# Patient Record
Sex: FEMALE | Race: OTHER | HISPANIC OR LATINO
[De-identification: names, ages, dates, MRNs, and addresses within clinical notes are randomized per-mention and may not be internally consistent; named-entity substitution may affect disease eponyms.]

---

## 2019-07-03 PROBLEM — Z00.00 ENCOUNTER FOR PREVENTIVE HEALTH EXAMINATION: Status: ACTIVE | Noted: 2019-07-03

## 2019-07-30 ENCOUNTER — APPOINTMENT (OUTPATIENT)
Dept: ENDOCRINOLOGY | Facility: CLINIC | Age: 34
End: 2019-07-30
Payer: COMMERCIAL

## 2019-07-30 VITALS
BODY MASS INDEX: 22.99 KG/M2 | SYSTOLIC BLOOD PRESSURE: 96 MMHG | DIASTOLIC BLOOD PRESSURE: 62 MMHG | HEART RATE: 89 BPM | WEIGHT: 138 LBS | HEIGHT: 65 IN

## 2019-07-30 DIAGNOSIS — Z80.9 FAMILY HISTORY OF MALIGNANT NEOPLASM, UNSPECIFIED: ICD-10-CM

## 2019-07-30 DIAGNOSIS — H04.123 DRY EYE SYNDROME OF BILATERAL LACRIMAL GLANDS: ICD-10-CM

## 2019-07-30 DIAGNOSIS — Z82.49 FAMILY HISTORY OF ISCHEMIC HEART DISEASE AND OTHER DISEASES OF THE CIRCULATORY SYSTEM: ICD-10-CM

## 2019-07-30 DIAGNOSIS — Z86.69 PERSONAL HISTORY OF OTHER DISEASES OF THE NERVOUS SYSTEM AND SENSE ORGANS: ICD-10-CM

## 2019-07-30 DIAGNOSIS — D69.2 OTHER NONTHROMBOCYTOPENIC PURPURA: ICD-10-CM

## 2019-07-30 DIAGNOSIS — Z87.42 PERSONAL HISTORY OF OTHER DISEASES OF THE FEMALE GENITAL TRACT: ICD-10-CM

## 2019-07-30 PROCEDURE — 36415 COLL VENOUS BLD VENIPUNCTURE: CPT

## 2019-07-30 PROCEDURE — 99204 OFFICE O/P NEW MOD 45 MIN: CPT | Mod: 25

## 2019-07-30 RX ORDER — POLYETHYLENE GLYCOL 400 AND PROPYLENE GLYCOL 4; 3 MG/ML; MG/ML
0.4-0.3 SOLUTION/ DROPS OPHTHALMIC
Refills: 0 | Status: ACTIVE | COMMUNITY

## 2019-07-30 RX ORDER — TOBRAMYCIN AND DEXAMETHASONE 1; 3 MG/ML; MG/ML
0.3-0.1 SUSPENSION/ DROPS OPHTHALMIC
Refills: 0 | Status: ACTIVE | COMMUNITY

## 2019-07-30 RX ORDER — PSYLLIUM SEED
PACKET (EA) ORAL
Refills: 0 | Status: ACTIVE | COMMUNITY

## 2019-07-30 RX ORDER — PNV/FERROUS SULFATE/FOLIC ACID 27-<0.5MG
TABLET ORAL
Refills: 0 | Status: ACTIVE | COMMUNITY

## 2019-07-30 NOTE — PHYSICAL EXAM
[No Acute Distress] : no acute distress [Alert] : alert [Well Nourished] : well nourished [Healthy Appearance] : healthy appearance [Well Developed] : well developed [Normal Voice/Communication] : normal voice communication [Normal Sclera/Conjunctiva] : normal sclera/conjunctiva [EOMI] : extra ocular movement intact [PERRL] : pupils equal, round and reactive to light [Normal Oropharynx] : the oropharynx was normal [No Proptosis] : no proptosis [Thyroid Not Enlarged] : the thyroid was not enlarged [No Thyroid Nodules] : there were no palpable thyroid nodules [No Respiratory Distress] : no respiratory distress [No Accessory Muscle Use] : no accessory muscle use [Clear to Auscultation] : lungs were clear to auscultation bilaterally [Normal Rate] : heart rate was normal  [Pedal Pulses Normal] : the pedal pulses are present [Regular Rhythm] : with a regular rhythm [No Edema] : there was no peripheral edema [No Spinal Tenderness] : no spinal tenderness [Spine Straight] : spine straight [Normal Gait] : normal gait [No Stigmata of Cushings Syndrome] : no stigmata of cushings syndrome [Normal Strength/Tone] : muscle strength and tone were normal [No Rash] : no rash [No Tremors] : no tremors [Oriented x3] : oriented to person, place, and time [Normal Mood] : the mood was normal [Normal Affect] : the affect was normal [Normal Insight/Judgement] : insight and judgment were intact [Acanthosis Nigricans] : no acanthosis nigricans

## 2019-07-30 NOTE — ASSESSMENT
[FreeTextEntry1] : &\par Currently pregnant.\par Has history of elevated prolactin.\par By history, most likely a microadenoma.\par Will check prolactin, TSH today.\par ROV 3 weeks.\par [facility] records requested.

## 2019-07-30 NOTE — HISTORY OF PRESENT ILLNESS
[FreeTextEntry1] : July 30, 2019\par PCP:  Dr. Sawyer Lazo (per [facility] ID)  as well as [facility] (per [facility] records)  \par          High risk:  Dr. Lizbet Carter \par          Midwife c/o [facility]:  Clarice Hernandezion   phone [phone]   fax [phone]  \par \par \par CC:  Pregnant with EDC  12/24/2019   (last dopamine agonist 6/2018)  LMP March 19, 2019\par         History of hyperprolactinemia\par \par 32 [place] - [place] [facility] - [place] by her .   She is pregnant with EDC December 24, 2019.   Being followed by her nurse midwife at [facility] as well as by high risk practitioner, Dr. Lizbet Carter.  \par She currently feels well and is not taking medication to lower the prolactin.  \par \par Impression:  History of hyperprolactinemia.   Those records are not currently available.\par \par Plan: Updated lab tests today and ROV in about 3 weeks.   \par Will request previous records from [facility], especially lab data, prolactin levels.    \par \par \par \par

## 2019-08-01 LAB
ALBUMIN SERPL ELPH-MCNC: 3.7 G/DL
ALP BLD-CCNC: 52 U/L
ALT SERPL-CCNC: 40 U/L
ANION GAP SERPL CALC-SCNC: 12 MMOL/L
AST SERPL-CCNC: 25 U/L
BILIRUB DIRECT SERPL-MCNC: 0.1 MG/DL
BILIRUB INDIRECT SERPL-MCNC: 0.1 MG/DL
BILIRUB SERPL-MCNC: <0.2 MG/DL
BUN SERPL-MCNC: 13 MG/DL
CALCIUM SERPL-MCNC: 9.5 MG/DL
CHLORIDE SERPL-SCNC: 106 MMOL/L
CO2 SERPL-SCNC: 19 MMOL/L
CREAT SERPL-MCNC: 0.52 MG/DL
GLUCOSE SERPL-MCNC: 82 MG/DL
HCG SERPL-MCNC: ABNORMAL MIU/ML
POTASSIUM SERPL-SCNC: 4.2 MMOL/L
PROLACTIN SERPL-MCNC: 269 NG/ML
PROT SERPL-MCNC: 6.3 G/DL
SODIUM SERPL-SCNC: 137 MMOL/L
T4 SERPL-MCNC: 7.8 UG/DL
TSH SERPL-ACNC: 1.55 UIU/ML

## 2019-09-04 ENCOUNTER — APPOINTMENT (OUTPATIENT)
Dept: ENDOCRINOLOGY | Facility: CLINIC | Age: 34
End: 2019-09-04
Payer: COMMERCIAL

## 2019-09-04 VITALS
HEART RATE: 82 BPM | HEIGHT: 65 IN | SYSTOLIC BLOOD PRESSURE: 100 MMHG | DIASTOLIC BLOOD PRESSURE: 60 MMHG | BODY MASS INDEX: 24.66 KG/M2 | WEIGHT: 148 LBS

## 2019-09-04 PROCEDURE — 99214 OFFICE O/P EST MOD 30 MIN: CPT

## 2019-09-05 NOTE — ASSESSMENT
[FreeTextEntry1] : ~\par History of elevated prolactin treated in [place] with dopamine agonist.\par Prolactin currently within gestationally appropriate range.\par Will continue surveillance.\par After delivery can be considered for MRI of pituitary.

## 2019-09-05 NOTE — HISTORY OF PRESENT ILLNESS
[FreeTextEntry1] : September 4, 2019\par \par PCP:   [facility] (per [facility] records)  \par          High risk:  Dr. Lizbet Carter \par          Midwife c/o [facility]:  Cici Kenyon - phone [phone]   fax [phone]  \par \par \par CC:  Pregnant with EDC  12/24/2019   (last dopamine agonist 6/2018)  LMP March 19, 2019\par         History of hyperprolactinemia\par \par Told of elevated prolactin in [place].  May have been treated with dopamine agonist in [place] for a month or two in 2018.  \par Reference prolactin values during pregnancy are:\par 1st trimester, up to 213\par 2nd trimester, up to 330\par 3rd trimester, up to 372\par \par On 7/30/19, her prolactin was 269 when TSH 1.55\par \par Impression:  Prolactinoma.\par Feels well, doing well.  \par \par Plan:  Surveillance.     \par ROV in November and then January.  \par Will eventually arrange for MRI of sella.  \par \par \par \par July 30, 2019\par PCP:  Dr. Sawyer Lazo (per [facility] ID)  as well as [facility] (per [facility] records)  \par          High risk:  Dr. Lizbet Carter \par          Midwife c/o [facility]:  Clarice Garcia   phone [phone]   fax [phone]  \par \par \par CC:  Pregnant with EDC  12/24/2019   (last dopamine agonist 6/2018)  LMP March 19, 2019\par         History of hyperprolactinemia\par \par 32 [facility] [place] [facility] [place] [facility] .   She is pregnant with EDC December 24, 2019.   Being followed by her nurse midwife at [facility] as well as by high risk practitioner, Dr. Lizbet Carter.  \par She currently feels well and is not taking medication to lower the prolactin.  \par \par Impression:  History of hyperprolactinemia.   Those records are not currently available.\par \par Plan: Updated lab tests today and ROV in about 3 weeks.   \par Will request previous records from [facility], especially lab data, prolactin levels.    \par \par \par \par

## 2019-09-05 NOTE — PHYSICAL EXAM
[No Acute Distress] : no acute distress [Alert] : alert [Well Nourished] : well nourished [Well Developed] : well developed [Healthy Appearance] : healthy appearance [Normal Voice/Communication] : normal voice communication [No Proptosis] : no proptosis [No Respiratory Distress] : no respiratory distress [Thyroid Not Enlarged] : the thyroid was not enlarged [Normal Rate and Effort] : normal respiratory rhythm and effort [No Accessory Muscle Use] : no accessory muscle use

## 2020-02-06 ENCOUNTER — APPOINTMENT (OUTPATIENT)
Dept: ENDOCRINOLOGY | Facility: CLINIC | Age: 35
End: 2020-02-06
Payer: COMMERCIAL

## 2020-02-06 VITALS
DIASTOLIC BLOOD PRESSURE: 90 MMHG | HEIGHT: 65 IN | HEART RATE: 93 BPM | SYSTOLIC BLOOD PRESSURE: 120 MMHG | BODY MASS INDEX: 23.99 KG/M2 | WEIGHT: 144 LBS

## 2020-02-06 DIAGNOSIS — Z86.39 PERSONAL HISTORY OF OTHER ENDOCRINE, NUTRITIONAL AND METABOLIC DISEASE: ICD-10-CM

## 2020-02-06 PROCEDURE — 36415 COLL VENOUS BLD VENIPUNCTURE: CPT

## 2020-02-06 PROCEDURE — 99214 OFFICE O/P EST MOD 30 MIN: CPT | Mod: 25

## 2020-02-06 NOTE — ASSESSMENT
[FreeTextEntry1] : &\par Accompanied by her relative through marriage.\par She feels quite well.\par Breast feeding without difficulty.\par This will contribute to prolactin elevation.\par Ideally will check prolactin when she has completed breast feeding.\par Even so, will need to make arrangements for MRI of sella.

## 2020-02-06 NOTE — PHYSICAL EXAM
[Alert] : alert [No Acute Distress] : no acute distress [Well Nourished] : well nourished [Well Developed] : well developed [Normal Voice/Communication] : normal voice communication [Healthy Appearance] : healthy appearance [No Proptosis] : no proptosis [No Respiratory Distress] : no respiratory distress [Thyroid Not Enlarged] : the thyroid was not enlarged [Normal Rate and Effort] : normal respiratory rhythm and effort [No Accessory Muscle Use] : no accessory muscle use

## 2020-02-06 NOTE — HISTORY OF PRESENT ILLNESS
[FreeTextEntry1] : Feb 06, 2020\par \par PCP:   [facility] (per [facility] records)  \par          High risk:  Dr. Lizbet Carter \par          Midwife c/o [facility]:  Cici Kenyon - phone [phone]   fax [phone]  \par \par \par CC:  Pregnant with EDC  12/24/2019   (last dopamine agonist 6/2018)  LMP March 19, 2019\par         History of hyperprolactinemia\par \par Delivered healthy baby girl December 17, 2019 at [place].   Dr. Lugo.\par Currently breast feeding without difficulty.\par Feels well.\par \par Impression:  Prolactinoma by history.\par Needs MRI of sella.  ACOG indicates contrast is not contraindicated.\par \par Plan:   Labs today, TFTs, prolactin.\par MRI requested.  \par \par ROV April.  \par \par \par \par \par September 4, 2019\par \par PCP:   [facility] (per [facility] records)  \par          High risk:  Dr. Lizbet Carter \par          Midwife c/o [facility]:  Clarice RadhaCici wooten [employer] - phone [phone]   fax [phone]  \par \par \par CC:  Pregnant with EDC  12/24/2019   (last dopamine agonist 6/2018)  LMP March 19, 2019\par         History of hyperprolactinemia\par \par Told of elevated prolactin in [place].  May have been treated with dopamine agonist in [place] for a month or two in 2018.  \par Reference prolactin values during pregnancy are:\par 1st trimester, up to 213\par 2nd trimester, up to 330\par 3rd trimester, up to 372\par \par On 7/30/19, her prolactin was 269 when TSH 1.55\par \par Impression:  Prolactinoma.\par Feels well, doing well.  \par \par Plan:  Surveillance.     \par ROV in November and then January.  \par Will eventually arrange for MRI of sella.  \par \par \par \par July 30, 2019\par PCP:  Dr. Sawyer Lazo (per [facility] ID)  as well as [facility] (per [facility] records)  \par          High risk:  Dr. Lizbet Carter \par          Midwife c/o [facility]:  Clarice Garcia   phone [phone]   fax [phone]  \par \par \par CC:  Pregnant with EDC  12/24/2019   (last dopamine agonist 6/2018)  LMP March 19, 2019\par         History of hyperprolactinemia\par \par 32 [place] - [facility] [place] [facility] [place] her .   She is pregnant with EDC December 24, 2019.   Being followed by her nurse midwife at [facility] as well as by high risk practitioner, Dr. Lizbet Carter.  \par She currently feels well and is not taking medication to lower the prolactin.  \par \par Impression:  History of hyperprolactinemia.   Those records are not currently available.\par \par Plan: Updated lab tests today and ROV in about 3 weeks.   \par Will request previous records from [facility], especially lab data, prolactin levels.    \par \par \par \par

## 2020-02-08 LAB
ANION GAP SERPL CALC-SCNC: 12 MMOL/L
BUN SERPL-MCNC: 21 MG/DL
CALCIUM SERPL-MCNC: 10.2 MG/DL
CHLORIDE SERPL-SCNC: 105 MMOL/L
CO2 SERPL-SCNC: 26 MMOL/L
CREAT SERPL-MCNC: 0.75 MG/DL
GLUCOSE SERPL-MCNC: 86 MG/DL
POTASSIUM SERPL-SCNC: 4.5 MMOL/L
PROLACTIN SERPL-MCNC: 140 NG/ML
SODIUM SERPL-SCNC: 143 MMOL/L
TSH SERPL-ACNC: 1.03 UIU/ML

## 2020-05-21 ENCOUNTER — APPOINTMENT (OUTPATIENT)
Dept: ENDOCRINOLOGY | Facility: CLINIC | Age: 35
End: 2020-05-21
Payer: COMMERCIAL

## 2020-05-21 PROCEDURE — 99443: CPT

## 2020-05-22 NOTE — ASSESSMENT
[FreeTextEntry1] : Hyperprolactinemia, still breast feeding.\par ROV August and follow up labs then and eventual MRI sella

## 2020-05-22 NOTE — HISTORY OF PRESENT ILLNESS
[Home] : at home, [unfilled] , at the time of the visit. [Medical Office: ([facility])___] : at the medical office located in  [Verbal consent obtained from patient] : the patient, [unfilled] [FreeTextEntry1] : Feb 06, 2020\par \par PCP:   [facility] (per [facility] records)  \par          High risk:  Dr. Lizbet Carter \par          Midwife c/o [facility]:  Cici Kenyon - phone [phone]   fax [phone]  \par \par \par CC:  Pregnant with EDC  12/24/2019   (last dopamine agonist 6/2018)  LMP March 19, 2019\par         History of hyperprolactinemia\par \par Delivered healthy baby girl December 17, 2019 at [place].   Dr. Lugo.\par Currently breast feeding without difficulty.\par Feels well.\par \par Impression:  Prolactinoma by history.\par Needs MRI of sella.  ACOG indicates contrast is not contraindicated.\par \par Plan:   Labs today, TFTs, prolactin.\par MRI requested.  \par \par ROV April.  \par \par \par \par \par September 4, 2019\par \par PCP:   [facility] (per [facility] records)  \par          High risk:  Dr. Lizbet Carter \par          Midwife c/o [facility]:  Clarice RadhaCici wooten [employer] - phone [phone]   fax [phone]  \par \par \par CC:  Pregnant with EDC  12/24/2019   (last dopamine agonist 6/2018)  LMP March 19, 2019\par         History of hyperprolactinemia\par \par Told of elevated prolactin in [place].  May have been treated with dopamine agonist in [place] for a month or two in 2018.  \par Reference prolactin values during pregnancy are:\par 1st trimester, up to 213\par 2nd trimester, up to 330\par 3rd trimester, up to 372\par \par On 7/30/19, her prolactin was 269 when TSH 1.55\par \par Impression:  Prolactinoma.\par Feels well, doing well.  \par \par Plan:  Surveillance.     \par ROV in November and then January.  \par Will eventually arrange for MRI of sella.  \par \par \par \par July 30, 2019\par PCP:  Dr. Sawyer Lazo (per [facility] ID)  as well as [facility] (per [facility] records)  \par          High risk:  Dr. Lizbet Carter \par          Midwife c/o [facility]:  Clarice Garcia   phone [phone]   fax [phone]  \par \par \par CC:  Pregnant with EDC  12/24/2019   (last dopamine agonist 6/2018)  LMP March 19, 2019\par         History of hyperprolactinemia\par \par 34 [place] [facility] [place] [facility] by her .   She is pregnant with EDC December 24, 2019.   Being followed by her nurse midwife at [facility] as well as by high risk practitioner, Dr. Lizbet Carter.  \par She currently feels well and is not taking medication to lower the prolactin.  \par \par Impression:  History of hyperprolactinemia.   Those records are not currently available.\par \par Plan: Updated lab tests today and ROV in about 3 weeks.   \par Will request previous records from [facility], especially lab data, prolactin levels.    \par \par \par \par

## 2020-08-19 ENCOUNTER — APPOINTMENT (OUTPATIENT)
Dept: ENDOCRINOLOGY | Facility: CLINIC | Age: 35
End: 2020-08-19

## 2020-08-31 ENCOUNTER — APPOINTMENT (OUTPATIENT)
Dept: ENDOCRINOLOGY | Facility: CLINIC | Age: 35
End: 2020-08-31
Payer: COMMERCIAL

## 2020-08-31 DIAGNOSIS — E22.1 HYPERPROLACTINEMIA: ICD-10-CM

## 2020-08-31 PROCEDURE — 99204 OFFICE O/P NEW MOD 45 MIN: CPT | Mod: 95

## 2020-08-31 NOTE — ASSESSMENT
[FreeTextEntry1] : History of hyperprolactinemia.\par Had successful pregnancy, .\par Wants to get pregnant again soon.

## 2020-08-31 NOTE — HISTORY OF PRESENT ILLNESS
[Home] : at home, [unfilled] , at the time of the visit. [Medical Office: ([facility])___] : at the medical office located in  [] :  [Verbal consent obtained from patient] : the patient, [unfilled] [FreeTextEntry1] : Aug 31, 2020     Telephone visit via [place]\par \par PCP:   [facility] (per [facility] records)  \par          High risk:  Dr. Lizbet Carter \par          Midwife c/o [facility]:  Clarice Garcia [employer] - phone [phone]   fax [phone]  \par \par \par CC:  Delivered  12/24/2019   (last dopamine agonist 6/2018), now breast feeding\par         History of hyperprolactinemia\par \par 33 yo - delivered her daughter by C section 12/17/2019 and she is eager to get pregnant again as soon as possible.\par Still trying to breast feed.    Menses have resumed and she feels well.\par \par Impression:  Updated lab tests would be helpful.\par Plan:  She will check with GYN and/or midwife regarding when she can safely get pregnant again.\par I have asked her to return here on tues Nov 3 at 3:30 PM and Ai will assist in setting that up.   \par \par \par \par May 21, 2020      Telephone visit via [employer] \par \par PCP:   [facility] (per [facility] records)  \par          High risk:  Dr. Lizbet Carter \par          Midwife c/o [facility]:  Clarice Garcia Cici [employer]os[employer] - phone [phone]   fax [phone]  \par \par \par CC:  Delivered  12/24/2019   (last dopamine agonist 6/2018), now breast feeding\par         History of hyperprolactinemia\par \par Breast feeding\par Doing well.\par Followed at [facility] in [place].\par Asked her to see me, hopefully in person, in late August and Ai will assist her\par February 6, 2020 labs TSH 1.03, prolactin 140 (had been 269 in July 2019\par \par \par Feb 06, 2020\par \par PCP:   [facility] (per [facility] records)  \par          High risk:  Dr. Lizbet Carter \par          Midwife c/o [facility]:  Clarice Garcia Cici Teraer - phone [phone]   fax [phone]  \par \par \par CC:  Pregnant with EDC  12/24/2019   (last dopamine agonist 6/2018)  LMP March 19, 2019\par         History of hyperprolactinemia\par \par Delivered healthy baby girl December 17, 2019 at [place].   Dr. Lugo.\par Currently breast feeding without difficulty.\par Feels well.\par \par Impression:  Prolactinoma by history.\par Needs MRI of sella.  ACOG indicates contrast is not contraindicated.\par \par Plan:   Labs today, TFTs, prolactin.\par MRI requested.  \par \par ROV April.  \par \par \par \par \par September 4, 2019\par \par PCP:   [facility] (per [facility] records)  \par          High risk:  Dr. Lizbet Carter \par          Midwife c/o [facility]:  Clarice Radha, Cici [employer] - phone [phone]   fax [phone]  \par \par \par CC:  Pregnant with EDC  12/24/2019   (last dopamine agonist 6/2018)  LMP March 19, 2019\par         History of hyperprolactinemia\par \par Told of elevated prolactin in [place].  May have been treated with dopamine agonist in [place] for a month or two in 2018.  \par Reference prolactin values during pregnancy are:\par 1st trimester, up to 213\par 2nd trimester, up to 330\par 3rd trimester, up to 372\par \par On 7/30/19, her prolactin was 269 when TSH 1.55\par \par Impression:  Prolactinoma.\par Feels well, doing well.  \par \par Plan:  Surveillance.     \par ROV in November and then January.  \par Will eventually arrange for MRI of sella.  \par \par \par \par July 30, 2019\par PCP:  Dr. Sawyer Lazo (per [facility] ID)  as well as [facility] (per [facility] records)  \par          High risk:  Dr. Lizbet Carter \par          Midwife c/o [facility]:  Clarice Hernandezion   phone [phone]   fax [phone]  \par \par \par CC:  Pregnant with EDC  12/24/2019   (last dopamine agonist 6/2018)  LMP March 19, 2019\par         History of hyperprolactinemia\par \par 34 [place] [facility] [place] by her .   She is pregnant with EDC December 24, 2019.   Being followed by her nurse midwife at [facility] as well as by high risk practitioner, Dr. Lizbet Carter.  \par She currently feels well and is not taking medication to lower the prolactin.  \par \par Impression:  History of hyperprolactinemia.   Those records are not currently available.\par \par Plan: Updated lab tests today and ROV in about 3 weeks.   \par Will request previous records from [facility], especially lab data, prolactin levels.    \par \par \par \par

## 2020-11-03 ENCOUNTER — APPOINTMENT (OUTPATIENT)
Dept: ENDOCRINOLOGY | Facility: CLINIC | Age: 35
End: 2020-11-03

## 2023-04-26 ENCOUNTER — NON-APPOINTMENT (OUTPATIENT)
Age: 38
End: 2023-04-26

## 2023-04-26 DIAGNOSIS — Z87.42 PERSONAL HISTORY OF OTHER DISEASES OF THE FEMALE GENITAL TRACT: ICD-10-CM

## 2023-04-26 DIAGNOSIS — Z82.49 FAMILY HISTORY OF ISCHEMIC HEART DISEASE AND OTHER DISEASES OF THE CIRCULATORY SYSTEM: ICD-10-CM

## 2023-04-26 DIAGNOSIS — Z80.42 FAMILY HISTORY OF MALIGNANT NEOPLASM OF PROSTATE: ICD-10-CM

## 2023-05-23 ENCOUNTER — APPOINTMENT (OUTPATIENT)
Dept: GASTROENTEROLOGY | Facility: CLINIC | Age: 38
End: 2023-05-23

## 2023-08-01 ENCOUNTER — NON-APPOINTMENT (OUTPATIENT)
Age: 38
End: 2023-08-01

## 2023-08-01 ENCOUNTER — APPOINTMENT (OUTPATIENT)
Dept: GASTROENTEROLOGY | Facility: CLINIC | Age: 38
End: 2023-08-01
Payer: COMMERCIAL

## 2023-08-01 DIAGNOSIS — K59.00 CONSTIPATION, UNSPECIFIED: ICD-10-CM

## 2023-08-01 DIAGNOSIS — R14.0 ABDOMINAL DISTENSION (GASEOUS): ICD-10-CM

## 2023-08-01 PROCEDURE — 99204 OFFICE O/P NEW MOD 45 MIN: CPT

## 2023-08-01 NOTE — HISTORY OF PRESENT ILLNESS
[FreeTextEntry1] :  This HPI  reflects a summary and review of records : including previous and most recent  Labs, body imaging, consults and progress notes, operative and pathology reports, EKG reports, ED records, found in [employer] [facility] [employer],  [facility] and any additional records brought in by  the patient at the time of the visit.   PCP: [facility]  38 yo F w h/o Hyperprolactinemia ( ? Prolactinoma), Endometriosis, Migraines Constipation  8/1/23    Today:  Feeling well, no c/o , CP, SOB/ VALENTINE, Cough, Wheeze, Palpitations, edema  8/1/23   Today:  Now 7 months pregnant w incr constipation                            Long h/o constipation--> usually uses Dulcolax 1-2 tabs q 3days--> # 5-6                           Told not to use as much during pregnancy, so feels more bloated                           No Abd pain, N/C, rectal bleeding                           Diet:  B--> corn flakes;  L--> Protein, rice; D--> [place] on dark Bread--> 6 grams                           Doesnt drink a lot of fluids   * Abd pain-->no * Nausea--> no * Vomit--> no * Early satiety--> no * Belching--> no * Hiccups--> no * Regurgitation--> no * Acid Taste / Water Brash--> no * Ht burn--> no * Dysphagia--> no * Throat Clearing--> no * Hoarseness--> no * Post-Nasal Drip--> no * Congestion--> no * Globus--> no * Cough--> no * Wheeze / PC-> -no * Strain on Defecation--> yes * Incompl Evac--> yes * Flatulence--> no * Gurgling--> no * Melena--> no * BPBPR-> -no * Anorexia--> no * Wt. Loss--> no

## 2023-08-01 NOTE — REVIEW OF SYSTEMS
[Scleral Icterus (Yellow Eyes)] : no scleral icterus [Confused] : no confusion [Proptosis] : no proptosis [Easy Bruising] : no tendency for easy bruising

## 2023-08-01 NOTE — ASSESSMENT
[FreeTextEntry1] :  1. Constipation-- Chronic : No pain, but constipation and  bloat      Probably CIC +/- Essential       ? Cathartic Colon        usually execerbated by hormonal change of pregmnancy  Recommend:  Diet: B:  add Raisan Bran + Fruit, L--> + veg, D--> + fruit * Diet: moderate -High Fiber,  Low Fat & Lactose free, Low FODMAPs--was reviewed & emphasized * Daily fluid intake was reviewed : 6  --  8 cups of decaffeinated fluid daily was emphasized * Regular aerobic exercise was emphasized * Probiotics  1  daily * Miralax  1 cap bid  [place] 1 Tblsp BID

## 2023-10-15 ENCOUNTER — RESULT REVIEW (OUTPATIENT)
Age: 38
End: 2023-10-15

## 2023-10-16 ENCOUNTER — TRANSCRIPTION ENCOUNTER (OUTPATIENT)
Age: 38
End: 2023-10-16

## 2023-10-18 ENCOUNTER — TRANSCRIPTION ENCOUNTER (OUTPATIENT)
Age: 38
End: 2023-10-18

## 2023-10-19 ENCOUNTER — TRANSCRIPTION ENCOUNTER (OUTPATIENT)
Age: 38
End: 2023-10-19

## 2023-10-28 ENCOUNTER — NON-APPOINTMENT (OUTPATIENT)
Age: 38
End: 2023-10-28

## 2023-12-04 ENCOUNTER — APPOINTMENT (OUTPATIENT)
Dept: GASTROENTEROLOGY | Facility: CLINIC | Age: 38
End: 2023-12-04